# Patient Record
Sex: MALE | Race: OTHER | NOT HISPANIC OR LATINO | URBAN - METROPOLITAN AREA
[De-identification: names, ages, dates, MRNs, and addresses within clinical notes are randomized per-mention and may not be internally consistent; named-entity substitution may affect disease eponyms.]

---

## 2020-06-07 ENCOUNTER — EMERGENCY (EMERGENCY)
Facility: HOSPITAL | Age: 30
LOS: 1 days | End: 2020-06-07
Attending: EMERGENCY MEDICINE | Admitting: EMERGENCY MEDICINE
Payer: COMMERCIAL

## 2020-06-07 VITALS
HEART RATE: 85 BPM | DIASTOLIC BLOOD PRESSURE: 68 MMHG | OXYGEN SATURATION: 98 % | TEMPERATURE: 98 F | RESPIRATION RATE: 20 BRPM | SYSTOLIC BLOOD PRESSURE: 118 MMHG

## 2020-06-07 VITALS
HEART RATE: 102 BPM | RESPIRATION RATE: 16 BRPM | WEIGHT: 179.9 LBS | HEIGHT: 66 IN | SYSTOLIC BLOOD PRESSURE: 110 MMHG | DIASTOLIC BLOOD PRESSURE: 60 MMHG | OXYGEN SATURATION: 97 % | TEMPERATURE: 98 F

## 2020-06-07 PROCEDURE — 71101 X-RAY EXAM UNILAT RIBS/CHEST: CPT

## 2020-06-07 PROCEDURE — 71101 X-RAY EXAM UNILAT RIBS/CHEST: CPT | Mod: 26

## 2020-06-07 PROCEDURE — 99283 EMERGENCY DEPT VISIT LOW MDM: CPT | Mod: 25

## 2020-06-07 PROCEDURE — 99283 EMERGENCY DEPT VISIT LOW MDM: CPT

## 2020-06-07 RX ORDER — IBUPROFEN 200 MG
1 TABLET ORAL
Qty: 12 | Refills: 0
Start: 2020-06-07 | End: 2020-06-09

## 2020-06-07 RX ORDER — LIDOCAINE 4 G/100G
1 CREAM TOPICAL
Qty: 30 | Refills: 0
Start: 2020-06-07 | End: 2020-07-06

## 2020-06-07 RX ORDER — OXYCODONE AND ACETAMINOPHEN 5; 325 MG/1; MG/1
1 TABLET ORAL ONCE
Refills: 0 | Status: DISCONTINUED | OUTPATIENT
Start: 2020-06-07 | End: 2020-06-07

## 2020-06-07 RX ORDER — LIDOCAINE 4 G/100G
1 CREAM TOPICAL ONCE
Refills: 0 | Status: COMPLETED | OUTPATIENT
Start: 2020-06-07 | End: 2020-06-07

## 2020-06-07 RX ADMIN — OXYCODONE AND ACETAMINOPHEN 1 TABLET(S): 5; 325 TABLET ORAL at 19:32

## 2020-06-07 RX ADMIN — LIDOCAINE 1 PATCH: 4 CREAM TOPICAL at 19:33

## 2020-06-07 NOTE — ED PROVIDER NOTE - CLINICAL SUMMARY MEDICAL DECISION MAKING FREE TEXT BOX
pt with chest wall tenderness s/p injury. will do x rays to r/o rib fx, incentive spirometer, pain control, lidoderm.

## 2020-06-07 NOTE — ED PROVIDER NOTE - ATTENDING CONTRIBUTION TO CARE
Remy Patel MD: I have personally performed a face to face diagnostic evaluation on this patient.  I have reviewed the PA note and agree with the history, exam, and plan of care, except as noted.  History and Exam by me shows same findings as documented  Attending note: Patient with injury to right anterolateral ribs. No other injury. Tender to ribs with no palpable crepitus. Breath sounds normal. No abdo pain or tenderness. Agree with plan

## 2020-06-07 NOTE — ED ADULT NURSE NOTE - NSIMPLEMENTINTERV_GEN_ALL_ED
Implemented All Universal Safety Interventions:  [place] to call system. Call bell, personal items and telephone within reach. Instruct patient to call for assistance. Room bathroom lighting operational. Non-slip footwear when patient is off stretcher. Physically safe environment: no spills, clutter or unnecessary equipment. Stretcher in lowest position, wheels locked, appropriate side rails in place.

## 2020-06-07 NOTE — ED PROVIDER NOTE - NSFOLLOWUPCLINICS_GEN_ALL_ED_FT
[facility]  Family Medicine  [address] [place] [address]  Phone: [phone]  Fax:   Follow Up Time:     [facility] Medicine  [address] [place] [address]  Phone: [phone]  Fax:   Follow Up Time:

## 2020-06-07 NOTE — ED ADULT TRIAGE NOTE - CHIEF COMPLAINT QUOTE
" My friend jump off the swimming pool board, and hit me as I lay in the float " " My friend jump off the swimming pool diving board, and hit me as I lay in a float "

## 2020-06-07 NOTE — ED PROVIDER NOTE - PHYSICAL EXAMINATION
SPINE: c-spine: supple, no spinal tenderness. no midline tenderness. no paraspinal tenderness. no body step off. no deformity. no muscle spasm. FROM   Thoracic spine: no spinal tenderness. no midline tenderness. no paraspinal tenderness. no body step off. no deformity. no muscle spasm.FROM   LS-spine:no spinal tenderness. no midline tenderness. no paraspinal tenderness. no body step off. no deformity.no muscle spasm. FROM n

## 2020-06-07 NOTE — ED PROVIDER NOTE - PATIENT PORTAL LINK FT
You can access the FollowMyHealth Patient Portal offered by [facility] by registering at the following website: [email or web address]. By joining [facility] [employer]’s FollowMyHealth portal, you will also be able to view your health information using other applications (apps) compatible with our system.

## 2020-06-07 NOTE — ED PROVIDER NOTE - PROGRESS NOTE DETAILS
pt improved. x rays reviewed on acute read. no acute findings. pt advised on possible findings on final read. will rx ibuprofen and lidoderm. incentive spirometer given. advised pmd follow up. all questions answered will dc

## 2020-06-07 NOTE — ED ADULT NURSE NOTE - OBJECTIVE STATEMENT
pt comes to ed c/o right side rib pain, pt states he was floating in the pool when his friend jumped off the diving board landing on him. states the pain is worse when he breathes or moves. pt denies cp, sob, cough, fever. breathe sounds equal bilaterals, unlabored.

## 2020-06-07 NOTE — ED PROVIDER NOTE - NSFOLLOWUPINSTRUCTIONS_ED_ALL_ED_FT
Rib Contusion    WHAT YOU NEED TO KNOW:    A rib contusion is a bruise on one or more of your ribs. Rib Cage         DISCHARGE INSTRUCTIONS:    Return to the emergency department if:     You have increased chest pain.       You have shortness of breath.       You start to cough up blood.      Your pain does not improve with pain medicine.    Contact your healthcare provider if:     You have a cough.      You have a fever.       You have questions or concerns about your condition or care.     Medicines: You may need any of the following:     NSAIDs, such as ibuprofen, help decrease swelling, pain, and fever. This medicine is available with or without a doctor's order. NSAIDs can cause stomach bleeding or kidney problems in certain people. If you take blood thinner medicine, always ask if NSAIDs are safe for you. Always read the medicine label and follow directions. Do not give these medicines to children under 6 months of age without direction from your child's healthcare provider.      Prescription pain medicine may be given. Ask how to take this medicine safely.      Take your medicine as directed. Contact your healthcare provider if you think your medicine is not helping or if you have side effects. Tell him of her if you are allergic to any medicine. Keep a list of the medicines, vitamins, and herbs you take. Include the amounts, and when and why you take them. Bring the list or the pill bottles to follow-up visits. Carry your medicine list with you in case of an emergency.    Deep breathing:     To help prevent pneumonia, take 10 deep breaths every hour, even when you wake up during the night. Brace your ribs with your hands or a pillow while you take deep breaths or cough. This will help decrease your pain.      You may need to use an incentive spirometer to help you take deeper breaths. Put the plastic piece into your mouth and take a very deep breath. Hold your breath as long as you can. Then let out your breath. Do this 10 times in a row every hour while you are awake.    Rest: Rest your ribs to decrease swelling and allow the injury to heal faster. Avoid activities that may cause more pain or damage to your ribs. As your pain decreases, begin movements slowly.    Ice: Ice helps decrease swelling and pain. Ice may also help prevent tissue damage. Use an ice pack or put crushed ice in a plastic bag. Cover it with a towel and place it on your bruised area for 15 to 20 minutes every hour as directed.    Follow up with your healthcare provider as directed: Write down your questions so you remember to ask them during your visits.       use incentive spirometer as discussed 10 breaths an hour for 3 days. take ibuprofen 600mg every 6 hours for pain. apply ice. use lidoderm patches

## 2020-06-07 NOTE — ED PROVIDER NOTE - OBJECTIVE STATEMENT
30 y/o male, current smoker, no significant PMHx, presents with rib pain today. Pt reports he was laying on a pool float and his friend jumped off a diving board onto pt's R side causing R rib pain. Pain increased with breathing and movement. Did not take anything for pain. Denies sob, cough, or fever.

## 2021-01-01 ENCOUNTER — OUTPATIENT (OUTPATIENT)
Dept: OUTPATIENT SERVICES | Facility: HOSPITAL | Age: 31
LOS: 1 days | End: 2021-01-01
Payer: COMMERCIAL

## 2021-01-01 DIAGNOSIS — Z20.828 CONTACT WITH AND (SUSPECTED) EXPOSURE TO OTHER VIRAL COMMUNICABLE DISEASES: ICD-10-CM

## 2021-01-01 LAB — SARS-COV-2 RNA SPEC QL NAA+PROBE: SIGNIFICANT CHANGE UP

## 2021-01-01 PROCEDURE — U0005: CPT

## 2021-01-01 PROCEDURE — C9803: CPT

## 2021-01-01 PROCEDURE — U0003: CPT

## 2021-01-02 DIAGNOSIS — Z20.828 CONTACT WITH AND (SUSPECTED) EXPOSURE TO OTHER VIRAL COMMUNICABLE DISEASES: ICD-10-CM

## 2021-08-10 NOTE — ED ADULT TRIAGE NOTE - SPO2 (%)
97 Graft Donor Site Bandage (Optional-Leave Blank If You Don't Want In Note): Steri-strips and a pressure bandage were applied to the donor site.

## 2021-09-23 ENCOUNTER — APPOINTMENT (OUTPATIENT)
Dept: INTERNAL MEDICINE | Facility: CLINIC | Age: 31
End: 2021-09-23

## 2021-09-30 ENCOUNTER — NON-APPOINTMENT (OUTPATIENT)
Age: 31
End: 2021-09-30

## 2021-09-30 ENCOUNTER — APPOINTMENT (OUTPATIENT)
Dept: INTERNAL MEDICINE | Facility: CLINIC | Age: 31
End: 2021-09-30
Payer: COMMERCIAL

## 2021-09-30 ENCOUNTER — LABORATORY RESULT (OUTPATIENT)
Age: 31
End: 2021-09-30

## 2021-09-30 VITALS
SYSTOLIC BLOOD PRESSURE: 106 MMHG | HEIGHT: 66 IN | HEART RATE: 65 BPM | DIASTOLIC BLOOD PRESSURE: 62 MMHG | RESPIRATION RATE: 14 BRPM | WEIGHT: 192 LBS | BODY MASS INDEX: 30.86 KG/M2 | TEMPERATURE: 97.2 F | OXYGEN SATURATION: 98 %

## 2021-09-30 DIAGNOSIS — M54.42 LUMBAGO WITH SCIATICA, LEFT SIDE: ICD-10-CM

## 2021-09-30 DIAGNOSIS — M51.9 UNSPECIFIED THORACIC, THORACOLUMBAR AND LUMBOSACRAL INTERVERTEBRAL DISC DISORDER: ICD-10-CM

## 2021-09-30 PROCEDURE — 90471 IMMUNIZATION ADMIN: CPT

## 2021-09-30 PROCEDURE — 90686 IIV4 VACC NO PRSV 0.5 ML IM: CPT

## 2021-09-30 PROCEDURE — 99385 PREV VISIT NEW AGE 18-39: CPT | Mod: 25

## 2021-09-30 PROCEDURE — 93000 ELECTROCARDIOGRAM COMPLETE: CPT | Mod: 59

## 2021-09-30 NOTE — HISTORY OF PRESENT ILLNESS
[FreeTextEntry1] : Here for CPE.\par Overall feeling well\par Pt has received two doses of Moderna vaccine [de-identified] : Doesn't smoke. Occaional alcohol\par Exercises 2-3 times per week\par Pt reports having a history of lumbar disc disease. He feels that it is getting worse over the past year.\par He had an MRI 8 years ago. The pain radiates to his left buttock and left leg.\par Pt reports having dyspepsia after eating certain foods. He has a history of hemorrhoids.

## 2021-09-30 NOTE — HEALTH RISK ASSESSMENT
[Excellent] : ~his/her~ current health as excellent [Very Good] : ~his/her~  mood as very good [Yes] : Yes [] : No [de-identified] : occasional [de-identified] : Exercises 2-3 times per week

## 2021-09-30 NOTE — PLAN
[FreeTextEntry1] : See Dr. Laurent, spine specialist, regarding sciatica\par See Dr. ALVAREZ , Colorectal, regarding hemorrhoids.\par See GI regarding dyspepsia

## 2021-10-03 LAB
ALBUMIN SERPL ELPH-MCNC: 4.7 G/DL
ALP BLD-CCNC: 69 U/L
ALT SERPL-CCNC: 18 U/L
ANION GAP SERPL CALC-SCNC: 11 MMOL/L
APPEARANCE: ABNORMAL
AST SERPL-CCNC: 18 U/L
BASOPHILS # BLD AUTO: 0.04 K/UL
BASOPHILS NFR BLD AUTO: 0.6 %
BILIRUB SERPL-MCNC: 0.4 MG/DL
BILIRUBIN URINE: NEGATIVE
BLOOD URINE: NEGATIVE
BUN SERPL-MCNC: 20 MG/DL
CALCIUM SERPL-MCNC: 9.7 MG/DL
CHLORIDE SERPL-SCNC: 105 MMOL/L
CHOLEST SERPL-MCNC: 229 MG/DL
CO2 SERPL-SCNC: 25 MMOL/L
COLOR: YELLOW
COVID-19 NUCLEOCAPSID  GAM ANTIBODY INTERPRETATION: NEGATIVE
COVID-19 SPIKE DOMAIN ANTIBODY INTERPRETATION: POSITIVE
CREAT SERPL-MCNC: 0.87 MG/DL
EOSINOPHIL # BLD AUTO: 0.12 K/UL
EOSINOPHIL NFR BLD AUTO: 1.7 %
ESTIMATED AVERAGE GLUCOSE: 103 MG/DL
FOLATE SERPL-MCNC: 5.5 NG/ML
GLUCOSE QUALITATIVE U: NEGATIVE
GLUCOSE SERPL-MCNC: 104 MG/DL
HBA1C MFR BLD HPLC: 5.2 %
HCT VFR BLD CALC: 47.3 %
HDLC SERPL-MCNC: 47 MG/DL
HGB BLD-MCNC: 14.9 G/DL
IMM GRANULOCYTES NFR BLD AUTO: 0.4 %
KETONES URINE: NEGATIVE
LDLC SERPL CALC-MCNC: 168 MG/DL
LEUKOCYTE ESTERASE URINE: NEGATIVE
LYMPHOCYTES # BLD AUTO: 2.38 K/UL
LYMPHOCYTES NFR BLD AUTO: 34.1 %
MAN DIFF?: NORMAL
MCHC RBC-ENTMCNC: 25.2 PG
MCHC RBC-ENTMCNC: 31.5 GM/DL
MCV RBC AUTO: 79.9 FL
MONOCYTES # BLD AUTO: 0.63 K/UL
MONOCYTES NFR BLD AUTO: 9 %
NEUTROPHILS # BLD AUTO: 3.78 K/UL
NEUTROPHILS NFR BLD AUTO: 54.2 %
NITRITE URINE: NEGATIVE
NONHDLC SERPL-MCNC: 182 MG/DL
PH URINE: 7
PLATELET # BLD AUTO: 200 K/UL
POTASSIUM SERPL-SCNC: 4.8 MMOL/L
PROT SERPL-MCNC: 6.8 G/DL
PROTEIN URINE: NEGATIVE
PSA SERPL-MCNC: 0.77 NG/ML
RBC # BLD: 5.92 M/UL
RBC # FLD: 15.3 %
SARS-COV-2 AB SERPL IA-ACNC: >250 U/ML
SARS-COV-2 AB SERPL QL IA: 0.08 INDEX
SODIUM SERPL-SCNC: 141 MMOL/L
SPECIFIC GRAVITY URINE: 1.02
TRIGL SERPL-MCNC: 70 MG/DL
TSH SERPL-ACNC: 2.1 UIU/ML
UROBILINOGEN URINE: NORMAL
VIT B12 SERPL-MCNC: 538 PG/ML
WBC # FLD AUTO: 6.98 K/UL

## 2021-10-08 LAB
ANTI ENDOMYSIAL IGA IFA: NEGATIVE
ANTI HUMAN TISSUE TRANSGLUTAMINASE IGA ELISA: < 1.9
DEAMIDATED GLIADIN ANTIBODY IGG: < 2.8
DEAMIDATED GLIADIN PEPTIDE IGA: < 5.2
PROMETHEUS CELIAC GENETICS: NORMAL
PROMETHEUS CELIAC SEROLOGY: NORMAL
PROMETHEUS LABORATORY FOOTER: NORMAL
TOTAL SERUM IGA BY NEPHELOMETRY: 129 MG/DL
TTG IGG SER IA-ACNC: NORMAL

## 2021-10-28 ENCOUNTER — NON-APPOINTMENT (OUTPATIENT)
Age: 31
End: 2021-10-28

## 2021-10-28 ENCOUNTER — APPOINTMENT (OUTPATIENT)
Dept: CARDIOLOGY | Facility: CLINIC | Age: 31
End: 2021-10-28
Payer: COMMERCIAL

## 2021-10-28 VITALS
OXYGEN SATURATION: 99 % | SYSTOLIC BLOOD PRESSURE: 110 MMHG | DIASTOLIC BLOOD PRESSURE: 70 MMHG | HEIGHT: 66 IN | BODY MASS INDEX: 30.53 KG/M2 | HEART RATE: 65 BPM | WEIGHT: 190 LBS

## 2021-10-28 DIAGNOSIS — I95.9 HYPOTENSION, UNSPECIFIED: ICD-10-CM

## 2021-10-28 PROCEDURE — 99204 OFFICE O/P NEW MOD 45 MIN: CPT

## 2021-10-28 PROCEDURE — 93000 ELECTROCARDIOGRAM COMPLETE: CPT

## 2021-11-16 ENCOUNTER — APPOINTMENT (OUTPATIENT)
Dept: CARDIOLOGY | Facility: CLINIC | Age: 31
End: 2021-11-16
Payer: COMMERCIAL

## 2021-11-16 PROCEDURE — 93351 STRESS TTE COMPLETE: CPT

## 2021-11-16 PROCEDURE — 93306 TTE W/DOPPLER COMPLETE: CPT

## 2022-01-21 ENCOUNTER — TRANSCRIPTION ENCOUNTER (OUTPATIENT)
Age: 32
End: 2022-01-21

## 2022-10-04 ENCOUNTER — APPOINTMENT (OUTPATIENT)
Dept: INTERNAL MEDICINE | Facility: CLINIC | Age: 32
End: 2022-10-04

## 2022-10-04 ENCOUNTER — NON-APPOINTMENT (OUTPATIENT)
Age: 32
End: 2022-10-04

## 2022-10-04 VITALS
HEART RATE: 61 BPM | HEIGHT: 66 IN | BODY MASS INDEX: 27.48 KG/M2 | TEMPERATURE: 97 F | DIASTOLIC BLOOD PRESSURE: 50 MMHG | SYSTOLIC BLOOD PRESSURE: 110 MMHG | OXYGEN SATURATION: 99 % | WEIGHT: 171 LBS | RESPIRATION RATE: 14 BRPM

## 2022-10-04 DIAGNOSIS — R10.13 EPIGASTRIC PAIN: ICD-10-CM

## 2022-10-04 PROCEDURE — 99395 PREV VISIT EST AGE 18-39: CPT | Mod: 25

## 2022-10-04 PROCEDURE — G0008: CPT

## 2022-10-04 PROCEDURE — 90686 IIV4 VACC NO PRSV 0.5 ML IM: CPT

## 2022-10-04 PROCEDURE — 93000 ELECTROCARDIOGRAM COMPLETE: CPT

## 2022-10-04 NOTE — HISTORY OF PRESENT ILLNESS
[FreeTextEntry1] : Pt is here for CPE\par Still c/o dyspepsia after eating certain foods, but he never saw GI as requested at the last visit.\par Still c/i hemorrhoids, but never saw Colorectal as requested at the last visit.\par Vaxed and 1 booster against covid.\par  [de-identified] : Never a smoker. Occasional alcohol.\par Doesn't exercise much

## 2022-10-04 NOTE — HEALTH RISK ASSESSMENT
[Very Good] : ~his/her~  mood as very good [Yes] : Yes [de-identified] : Occasional alcohol [de-identified] : Doesn't exercise much

## 2022-10-04 NOTE — PLAN
[FreeTextEntry1] : See GI regarding dyspepsia\par See Colorectal regarding hemorrhoids\par Check labs\par Recommend frequent exercise and healthy diet

## 2022-10-08 LAB
ALBUMIN SERPL ELPH-MCNC: 4.7 G/DL
ALP BLD-CCNC: 77 U/L
ALT SERPL-CCNC: 11 U/L
ANION GAP SERPL CALC-SCNC: 11 MMOL/L
APPEARANCE: CLEAR
AST SERPL-CCNC: 16 U/L
BASOPHILS # BLD AUTO: 0.05 K/UL
BASOPHILS NFR BLD AUTO: 0.7 %
BILIRUB SERPL-MCNC: 0.2 MG/DL
BILIRUBIN URINE: NEGATIVE
BLOOD URINE: NEGATIVE
BUN SERPL-MCNC: 13 MG/DL
CALCIUM SERPL-MCNC: 9.9 MG/DL
CHLORIDE SERPL-SCNC: 103 MMOL/L
CHOLEST SERPL-MCNC: 179 MG/DL
CO2 SERPL-SCNC: 27 MMOL/L
COLOR: NORMAL
CREAT SERPL-MCNC: 0.9 MG/DL
EGFR: 115 ML/MIN/1.73M2
EOSINOPHIL # BLD AUTO: 0.12 K/UL
EOSINOPHIL NFR BLD AUTO: 1.7 %
ESTIMATED AVERAGE GLUCOSE: 103 MG/DL
FERRITIN SERPL-MCNC: 297 NG/ML
FOLATE SERPL-MCNC: 4 NG/ML
GLUCOSE QUALITATIVE U: NEGATIVE
GLUCOSE SERPL-MCNC: 80 MG/DL
HBA1C MFR BLD HPLC: 5.2 %
HCT VFR BLD CALC: 46.5 %
HDLC SERPL-MCNC: 44 MG/DL
HGB BLD-MCNC: 14.8 G/DL
IMM GRANULOCYTES NFR BLD AUTO: 0.3 %
IRON SATN MFR SERPL: 36 %
IRON SERPL-MCNC: 106 UG/DL
KETONES URINE: NEGATIVE
LDLC SERPL CALC-MCNC: 123 MG/DL
LEUKOCYTE ESTERASE URINE: NEGATIVE
LYMPHOCYTES # BLD AUTO: 2.56 K/UL
LYMPHOCYTES NFR BLD AUTO: 35.3 %
MAN DIFF?: NORMAL
MCHC RBC-ENTMCNC: 25.6 PG
MCHC RBC-ENTMCNC: 31.8 GM/DL
MCV RBC AUTO: 80.3 FL
MONOCYTES # BLD AUTO: 0.66 K/UL
MONOCYTES NFR BLD AUTO: 9.1 %
NEUTROPHILS # BLD AUTO: 3.85 K/UL
NEUTROPHILS NFR BLD AUTO: 52.9 %
NITRITE URINE: NEGATIVE
NONHDLC SERPL-MCNC: 135 MG/DL
PH URINE: 6.5
PLATELET # BLD AUTO: 236 K/UL
POTASSIUM SERPL-SCNC: 5 MMOL/L
PROT SERPL-MCNC: 7.1 G/DL
PROTEIN URINE: NEGATIVE
PSA SERPL-MCNC: 0.73 NG/ML
RBC # BLD: 5.79 M/UL
RBC # FLD: 15.1 %
SODIUM SERPL-SCNC: 141 MMOL/L
SPECIFIC GRAVITY URINE: 1.02
TIBC SERPL-MCNC: 291 UG/DL
TRIGL SERPL-MCNC: 63 MG/DL
TSH SERPL-ACNC: 2.1 UIU/ML
UIBC SERPL-MCNC: 186 UG/DL
UROBILINOGEN URINE: NORMAL
VIT B12 SERPL-MCNC: 484 PG/ML
WBC # FLD AUTO: 7.26 K/UL

## 2022-11-17 ENCOUNTER — APPOINTMENT (OUTPATIENT)
Dept: COLORECTAL SURGERY | Facility: CLINIC | Age: 32
End: 2022-11-17

## 2022-11-17 ENCOUNTER — NON-APPOINTMENT (OUTPATIENT)
Age: 32
End: 2022-11-17

## 2022-11-17 PROCEDURE — 46600 DIAGNOSTIC ANOSCOPY SPX: CPT

## 2022-11-17 PROCEDURE — 99203 OFFICE O/P NEW LOW 30 MIN: CPT | Mod: 25

## 2022-11-17 NOTE — PHYSICAL EXAM
[Excoriation] : no perianal excoriation [Normal] : was normal [None] : there was no rectal mass  [Gross Blood] : no gross blood [Respiratory Effort] : normal respiratory effort [Normal Rate and Rhythm] : normal rate and rhythm [Calm] : calm [de-identified] : Soft, nontender, nondistended.  No mass or hernias appreciated. [de-identified] : Inflamed grade 3 internal hemorrhoids [de-identified] : Circumferential nonthrombosed external hemorrhoids [de-identified] : Well-appearing, in no distress [de-identified] : Normocephalic, atraumatic [de-identified] : Moves extremities without difficulty [de-identified] : Warm and dry [de-identified] : Alert and oriented x3

## 2022-11-17 NOTE — HISTORY OF PRESENT ILLNESS
[FreeTextEntry1] : 32-year-old male who presents for consultation for hemorrhoids.  His symptoms began about 6 years ago when he developed intermittent rectal bleeding.  He had a colonoscopy at that time which was unremarkable with the exception of hemorrhoids noted.  He changed his diet and lost a significant amount of weight, approximately 50 pounds.  Over the past year, his hemorrhoidal symptoms have worsened with frequent bouts of rectal bleeding associated with pain and a burning sensation in the area.  Symptoms are worse with standing and walking.  He has regular formed bowel movements but tends to have upwards of 5-6 stools daily.  He denies abdominal pain nausea or vomiting but does get abdominal bloating related to certain foods.

## 2022-11-17 NOTE — CONSULT LETTER
[Dear  ___] : Dear  [unfilled], [Consult Letter:] : I had the pleasure of evaluating your patient, [unfilled]. [Please see my note below.] : Please see my note below. [Consult Closing:] : Thank you very much for allowing me to participate in the care of this patient.  If you have any questions, please do not hesitate to contact me. [Sincerely,] : Sincerely, [FreeTextEntry3] : Ruslan Cotto MD\par

## 2022-12-07 ENCOUNTER — APPOINTMENT (OUTPATIENT)
Dept: GASTROENTEROLOGY | Facility: CLINIC | Age: 32
End: 2022-12-07

## 2022-12-22 ENCOUNTER — OUTPATIENT (OUTPATIENT)
Dept: OUTPATIENT SERVICES | Facility: HOSPITAL | Age: 32
LOS: 1 days | End: 2022-12-22
Payer: COMMERCIAL

## 2022-12-22 VITALS
SYSTOLIC BLOOD PRESSURE: 134 MMHG | OXYGEN SATURATION: 100 % | TEMPERATURE: 98 F | WEIGHT: 165.35 LBS | HEART RATE: 57 BPM | RESPIRATION RATE: 16 BRPM | HEIGHT: 66 IN | DIASTOLIC BLOOD PRESSURE: 67 MMHG

## 2022-12-22 DIAGNOSIS — Z01.818 ENCOUNTER FOR OTHER PREPROCEDURAL EXAMINATION: ICD-10-CM

## 2022-12-22 DIAGNOSIS — K64.9 UNSPECIFIED HEMORRHOIDS: ICD-10-CM

## 2022-12-22 DIAGNOSIS — Z90.89 ACQUIRED ABSENCE OF OTHER ORGANS: Chronic | ICD-10-CM

## 2022-12-22 LAB
ANION GAP SERPL CALC-SCNC: 5 MMOL/L — SIGNIFICANT CHANGE UP (ref 5–17)
APPEARANCE UR: CLEAR — SIGNIFICANT CHANGE UP
APTT BLD: 33.1 SEC — SIGNIFICANT CHANGE UP (ref 27.5–35.5)
BACTERIA # UR AUTO: ABNORMAL
BASOPHILS # BLD AUTO: 0.04 K/UL — SIGNIFICANT CHANGE UP (ref 0–0.2)
BASOPHILS NFR BLD AUTO: 0.6 % — SIGNIFICANT CHANGE UP (ref 0–2)
BILIRUB UR-MCNC: NEGATIVE — SIGNIFICANT CHANGE UP
BUN SERPL-MCNC: 18 MG/DL — SIGNIFICANT CHANGE UP (ref 7–23)
CALCIUM SERPL-MCNC: 9.4 MG/DL — SIGNIFICANT CHANGE UP (ref 8.5–10.1)
CHLORIDE SERPL-SCNC: 111 MMOL/L — HIGH (ref 96–108)
CO2 SERPL-SCNC: 25 MMOL/L — SIGNIFICANT CHANGE UP (ref 22–31)
COLOR SPEC: YELLOW — SIGNIFICANT CHANGE UP
CREAT SERPL-MCNC: 0.89 MG/DL — SIGNIFICANT CHANGE UP (ref 0.5–1.3)
DIFF PNL FLD: NEGATIVE — SIGNIFICANT CHANGE UP
EGFR: 115 ML/MIN/1.73M2 — SIGNIFICANT CHANGE UP
EOSINOPHIL # BLD AUTO: 0.04 K/UL — SIGNIFICANT CHANGE UP (ref 0–0.5)
EOSINOPHIL NFR BLD AUTO: 0.6 % — SIGNIFICANT CHANGE UP (ref 0–6)
EPI CELLS # UR: SIGNIFICANT CHANGE UP
GLUCOSE SERPL-MCNC: 89 MG/DL — SIGNIFICANT CHANGE UP (ref 70–99)
GLUCOSE UR QL: NEGATIVE — SIGNIFICANT CHANGE UP
HCT VFR BLD CALC: 41.6 % — SIGNIFICANT CHANGE UP (ref 39–50)
HGB BLD-MCNC: 13.3 G/DL — SIGNIFICANT CHANGE UP (ref 13–17)
IMM GRANULOCYTES NFR BLD AUTO: 0.1 % — SIGNIFICANT CHANGE UP (ref 0–0.9)
INR BLD: 1.1 RATIO — SIGNIFICANT CHANGE UP (ref 0.88–1.16)
KETONES UR-MCNC: ABNORMAL
LEUKOCYTE ESTERASE UR-ACNC: ABNORMAL
LYMPHOCYTES # BLD AUTO: 2.57 K/UL — SIGNIFICANT CHANGE UP (ref 1–3.3)
LYMPHOCYTES # BLD AUTO: 35.8 % — SIGNIFICANT CHANGE UP (ref 13–44)
MCHC RBC-ENTMCNC: 25.7 PG — LOW (ref 27–34)
MCHC RBC-ENTMCNC: 32 GM/DL — SIGNIFICANT CHANGE UP (ref 32–36)
MCV RBC AUTO: 80.3 FL — SIGNIFICANT CHANGE UP (ref 80–100)
MONOCYTES # BLD AUTO: 0.53 K/UL — SIGNIFICANT CHANGE UP (ref 0–0.9)
MONOCYTES NFR BLD AUTO: 7.4 % — SIGNIFICANT CHANGE UP (ref 2–14)
NEUTROPHILS # BLD AUTO: 3.98 K/UL — SIGNIFICANT CHANGE UP (ref 1.8–7.4)
NEUTROPHILS NFR BLD AUTO: 55.5 % — SIGNIFICANT CHANGE UP (ref 43–77)
NITRITE UR-MCNC: NEGATIVE — SIGNIFICANT CHANGE UP
PH UR: 6 — SIGNIFICANT CHANGE UP (ref 5–8)
PLATELET # BLD AUTO: 201 K/UL — SIGNIFICANT CHANGE UP (ref 150–400)
POTASSIUM SERPL-MCNC: 4.2 MMOL/L — SIGNIFICANT CHANGE UP (ref 3.5–5.3)
POTASSIUM SERPL-SCNC: 4.2 MMOL/L — SIGNIFICANT CHANGE UP (ref 3.5–5.3)
PROT UR-MCNC: NEGATIVE — SIGNIFICANT CHANGE UP
PROTHROM AB SERPL-ACNC: 12.8 SEC — SIGNIFICANT CHANGE UP (ref 10.5–13.4)
RBC # BLD: 5.18 M/UL — SIGNIFICANT CHANGE UP (ref 4.2–5.8)
RBC # FLD: 14.9 % — HIGH (ref 10.3–14.5)
RBC CASTS # UR COMP ASSIST: NEGATIVE /HPF — SIGNIFICANT CHANGE UP (ref 0–4)
SODIUM SERPL-SCNC: 141 MMOL/L — SIGNIFICANT CHANGE UP (ref 135–145)
SP GR SPEC: 1.01 — SIGNIFICANT CHANGE UP (ref 1.01–1.02)
UROBILINOGEN FLD QL: NEGATIVE — SIGNIFICANT CHANGE UP
WBC # BLD: 7.17 K/UL — SIGNIFICANT CHANGE UP (ref 3.8–10.5)
WBC # FLD AUTO: 7.17 K/UL — SIGNIFICANT CHANGE UP (ref 3.8–10.5)
WBC UR QL: SIGNIFICANT CHANGE UP /HPF (ref 0–5)

## 2022-12-22 PROCEDURE — 81001 URINALYSIS AUTO W/SCOPE: CPT

## 2022-12-22 PROCEDURE — 85730 THROMBOPLASTIN TIME PARTIAL: CPT

## 2022-12-22 PROCEDURE — 99214 OFFICE O/P EST MOD 30 MIN: CPT | Mod: 25

## 2022-12-22 PROCEDURE — 85025 COMPLETE CBC W/AUTO DIFF WBC: CPT

## 2022-12-22 PROCEDURE — 80048 BASIC METABOLIC PNL TOTAL CA: CPT

## 2022-12-22 PROCEDURE — 85610 PROTHROMBIN TIME: CPT

## 2022-12-22 PROCEDURE — 36415 COLL VENOUS BLD VENIPUNCTURE: CPT

## 2022-12-22 NOTE — H&P PST ADULT - ASSESSMENT
This is a 31 y/o male with hemorrhoids who is schedule for a hemorrhoidectomy    Patient instructed on     1. To follow instructions as per ASU for NPO status   2. Instructed to schedule COVID 19 swab appointment

## 2022-12-22 NOTE — H&P PST ADULT - HISTORY OF PRESENT ILLNESS
This is a 31 y/o male with no PMH who is scheduled for a hemorrhoidectomy. Pt reports he had hemorrhoids a few years ago, he had a colonoscopy at that time and hemorrhoids were found at that time. He reports he lost weight and his symptoms resolved.  About six months ago (August 2022) his symptoms started up again where he reports + rectal bleeding, pain and burning sensation. He denies any SOB, chest pain, palpations or recent fevers.

## 2022-12-23 DIAGNOSIS — K64.9 UNSPECIFIED HEMORRHOIDS: ICD-10-CM

## 2022-12-23 DIAGNOSIS — Z01.818 ENCOUNTER FOR OTHER PREPROCEDURAL EXAMINATION: ICD-10-CM

## 2022-12-27 ENCOUNTER — TRANSCRIPTION ENCOUNTER (OUTPATIENT)
Age: 32
End: 2022-12-27

## 2022-12-27 ENCOUNTER — APPOINTMENT (OUTPATIENT)
Dept: COLORECTAL SURGERY | Facility: HOSPITAL | Age: 32
End: 2022-12-27
Payer: COMMERCIAL

## 2022-12-27 ENCOUNTER — OUTPATIENT (OUTPATIENT)
Dept: INPATIENT UNIT | Facility: HOSPITAL | Age: 32
LOS: 1 days | Discharge: HOME | End: 2022-12-27
Payer: COMMERCIAL

## 2022-12-27 ENCOUNTER — RESULT REVIEW (OUTPATIENT)
Age: 32
End: 2022-12-27

## 2022-12-27 VITALS
OXYGEN SATURATION: 100 % | TEMPERATURE: 98 F | SYSTOLIC BLOOD PRESSURE: 114 MMHG | DIASTOLIC BLOOD PRESSURE: 63 MMHG | HEIGHT: 66 IN | RESPIRATION RATE: 16 BRPM | HEART RATE: 57 BPM | WEIGHT: 164.91 LBS

## 2022-12-27 VITALS
RESPIRATION RATE: 16 BRPM | DIASTOLIC BLOOD PRESSURE: 65 MMHG | OXYGEN SATURATION: 100 % | SYSTOLIC BLOOD PRESSURE: 110 MMHG | HEART RATE: 55 BPM | TEMPERATURE: 98 F

## 2022-12-27 DIAGNOSIS — K64.9 UNSPECIFIED HEMORRHOIDS: ICD-10-CM

## 2022-12-27 DIAGNOSIS — Z90.89 ACQUIRED ABSENCE OF OTHER ORGANS: Chronic | ICD-10-CM

## 2022-12-27 PROCEDURE — 46260 REMOVE IN/EX HEM GROUPS 2+: CPT

## 2022-12-27 PROCEDURE — C1889: CPT

## 2022-12-27 PROCEDURE — 88304 TISSUE EXAM BY PATHOLOGIST: CPT | Mod: 26

## 2022-12-27 PROCEDURE — 88304 TISSUE EXAM BY PATHOLOGIST: CPT

## 2022-12-27 PROCEDURE — C9290: CPT

## 2022-12-27 RX ORDER — FENTANYL CITRATE 50 UG/ML
50 INJECTION INTRAVENOUS
Refills: 0 | Status: DISCONTINUED | OUTPATIENT
Start: 2022-12-27 | End: 2022-12-27

## 2022-12-27 RX ORDER — OXYCODONE HYDROCHLORIDE 5 MG/1
1 TABLET ORAL
Qty: 20 | Refills: 0
Start: 2022-12-27

## 2022-12-27 RX ORDER — HYDROMORPHONE HYDROCHLORIDE 2 MG/ML
0.5 INJECTION INTRAMUSCULAR; INTRAVENOUS; SUBCUTANEOUS
Refills: 0 | Status: DISCONTINUED | OUTPATIENT
Start: 2022-12-27 | End: 2022-12-27

## 2022-12-27 RX ORDER — SODIUM CHLORIDE 9 MG/ML
1000 INJECTION, SOLUTION INTRAVENOUS
Refills: 0 | Status: DISCONTINUED | OUTPATIENT
Start: 2022-12-27 | End: 2022-12-27

## 2022-12-27 RX ORDER — ONDANSETRON 8 MG/1
4 TABLET, FILM COATED ORAL ONCE
Refills: 0 | Status: DISCONTINUED | OUTPATIENT
Start: 2022-12-27 | End: 2022-12-27

## 2022-12-27 RX ORDER — OXYCODONE HYDROCHLORIDE 5 MG/1
10 TABLET ORAL ONCE
Refills: 0 | Status: DISCONTINUED | OUTPATIENT
Start: 2022-12-27 | End: 2022-12-27

## 2022-12-27 RX ORDER — OXYCODONE HYDROCHLORIDE 5 MG/1
5 TABLET ORAL ONCE
Refills: 0 | Status: DISCONTINUED | OUTPATIENT
Start: 2022-12-27 | End: 2022-12-27

## 2022-12-27 RX ADMIN — OXYCODONE HYDROCHLORIDE 5 MILLIGRAM(S): 5 TABLET ORAL at 16:30

## 2022-12-27 RX ADMIN — SODIUM CHLORIDE 75 MILLILITER(S): 9 INJECTION, SOLUTION INTRAVENOUS at 15:39

## 2022-12-27 RX ADMIN — OXYCODONE HYDROCHLORIDE 5 MILLIGRAM(S): 5 TABLET ORAL at 18:45

## 2022-12-27 RX ADMIN — OXYCODONE HYDROCHLORIDE 5 MILLIGRAM(S): 5 TABLET ORAL at 15:53

## 2022-12-27 RX ADMIN — FENTANYL CITRATE 50 MICROGRAM(S): 50 INJECTION INTRAVENOUS at 15:39

## 2022-12-27 RX ADMIN — FENTANYL CITRATE 50 MICROGRAM(S): 50 INJECTION INTRAVENOUS at 15:54

## 2022-12-27 NOTE — BRIEF OPERATIVE NOTE - NSICDXBRIEFPOSTOP_GEN_ALL_CORE_FT
POST-OP DIAGNOSIS:  Internal and external bleeding hemorrhoids 27-Dec-2022 15:24:24  Yoselyn Vasquez

## 2022-12-27 NOTE — ASU DISCHARGE PLAN (ADULT/PEDIATRIC) - CARE PROVIDER_API CALL
Morelia Cotto)  ColonRectal Surgery; [place]  [address] [address]  Phone: [phone]  Fax: [phone]  Follow Up Time: 1 month

## 2022-12-27 NOTE — BRIEF OPERATIVE NOTE - NSICDXBRIEFPREOP_GEN_ALL_CORE_FT
PRE-OP DIAGNOSIS:  Internal and external bleeding hemorrhoids 27-Dec-2022 15:24:13  Yoselyn Vasquez

## 2022-12-27 NOTE — BRIEF OPERATIVE NOTE - NSICDXBRIEFPROCEDURE_GEN_ALL_CORE_FT
PROCEDURES:  Complex hemorrhoidectomy, internal and external 27-Dec-2022 15:23:56  Yoselyn Vasquez

## 2022-12-27 NOTE — ASU PATIENT PROFILE, ADULT - IS PATIENT PREGNANT?
Health Maintenance Due   Topic Date Due   • Pneumococcal Vaccine 0-64 (2 of 4 - PPSV23) 01/11/2021   • DM/CKD Microalbumin  07/10/2021   • DM/CKD GFR  07/10/2021   • COVID-19 Vaccine (3 - Booster for Pfizer series) 10/23/2021       Patient is due for topics as listed above but is not proceeding with Immunization(s) COVID-19 and Pneumococcal, DM/CKD Microalbumin and Glomerular Filtration Rate (GFR) at this time.  
She is feeling a lot better. She had a URI a few weeks ago, improved now, was on antibiotics. No other new issues.    She desires to already schedule IOL for her LYNDON, arranged today.  
not applicable (Male)

## 2022-12-29 RX ORDER — OXYCODONE HYDROCHLORIDE 5 MG/1
2 TABLET ORAL
Qty: 20 | Refills: 0
Start: 2022-12-29

## 2022-12-30 DIAGNOSIS — K64.8 OTHER HEMORRHOIDS: ICD-10-CM

## 2022-12-30 DIAGNOSIS — K64.4 RESIDUAL HEMORRHOIDAL SKIN TAGS: ICD-10-CM

## 2022-12-30 DIAGNOSIS — Z87.891 PERSONAL HISTORY OF NICOTINE DEPENDENCE: ICD-10-CM

## 2022-12-30 PROBLEM — Z87.19 PERSONAL HISTORY OF OTHER DISEASES OF THE DIGESTIVE SYSTEM: Chronic | Status: ACTIVE | Noted: 2022-12-22

## 2022-12-30 LAB — SURGICAL PATHOLOGY STUDY: SIGNIFICANT CHANGE UP

## 2023-01-19 ENCOUNTER — APPOINTMENT (OUTPATIENT)
Dept: COLORECTAL SURGERY | Facility: CLINIC | Age: 33
End: 2023-01-19
Payer: COMMERCIAL

## 2023-01-19 VITALS
HEART RATE: 87 BPM | DIASTOLIC BLOOD PRESSURE: 68 MMHG | HEIGHT: 66 IN | WEIGHT: 170 LBS | SYSTOLIC BLOOD PRESSURE: 103 MMHG | BODY MASS INDEX: 27.32 KG/M2

## 2023-01-19 PROCEDURE — 99212 OFFICE O/P EST SF 10 MIN: CPT

## 2023-01-19 NOTE — HISTORY OF PRESENT ILLNESS
[FreeTextEntry1] : 32-year-old male presents for postoperative visit after undergoing hemorrhoidectomy on December 27.  He is recovering well from surgery.  The pain is subsiding and he is off narcotic medications.  He is having regular bowel movements without difficulty.  No fevers or chills.  He still has some drainage from the surgical site.  He continues to have issues with abdominal bloating and distention which usually results in the pressure sensation in the rectum.

## 2023-01-19 NOTE — PHYSICAL EXAM
[Respiratory Effort] : normal respiratory effort [Calm] : calm [de-identified] : Healing perianal wounds without sign of infection. [de-identified] : Well-appearing, in no distress [de-identified] : Normocephalic, atraumatic [de-identified] : Moves extremities without difficulty [de-identified] : Warm and dry [de-identified] : Alert and oriented x3

## 2023-02-16 ENCOUNTER — APPOINTMENT (OUTPATIENT)
Dept: COLORECTAL SURGERY | Facility: CLINIC | Age: 33
End: 2023-02-16
Payer: COMMERCIAL

## 2023-02-16 VITALS
HEART RATE: 96 BPM | SYSTOLIC BLOOD PRESSURE: 119 MMHG | BODY MASS INDEX: 27.32 KG/M2 | WEIGHT: 170 LBS | DIASTOLIC BLOOD PRESSURE: 76 MMHG | HEIGHT: 66 IN

## 2023-02-16 DIAGNOSIS — K64.9 UNSPECIFIED HEMORRHOIDS: ICD-10-CM

## 2023-02-16 PROCEDURE — 99024 POSTOP FOLLOW-UP VISIT: CPT

## 2023-02-16 NOTE — HISTORY OF PRESENT ILLNESS
[FreeTextEntry1] : 32-year-old male presents for postoperative visit after undergoing hemorrhoidectomy on December 27.  He has been doing very well since surgery and his pain has subsided.  He denies any rectal bleeding.  He was doing well with bowel movements until recently where he had a bout of constipation and did not have a bowel movement for 3 days. This resulted in significant rectal pressure and pain. This is slowly resolving.\par \par

## 2023-02-16 NOTE — PHYSICAL EXAM
[Respiratory Effort] : normal respiratory effort [Calm] : calm [de-identified] : Healed perianal wounds. GRACIE not performed [de-identified] : Well-appearing, in no distress [de-identified] : Normocephalic, atraumatic [de-identified] : Moves extremities without difficulty [de-identified] : Warm and dry [de-identified] : Alert and oriented x3

## 2023-05-03 ENCOUNTER — NON-APPOINTMENT (OUTPATIENT)
Age: 33
End: 2023-05-03

## 2023-05-11 ENCOUNTER — TRANSCRIPTION ENCOUNTER (OUTPATIENT)
Age: 33
End: 2023-05-11

## 2023-10-10 ENCOUNTER — APPOINTMENT (OUTPATIENT)
Dept: INTERNAL MEDICINE | Facility: CLINIC | Age: 33
End: 2023-10-10
Payer: COMMERCIAL

## 2023-10-10 ENCOUNTER — LABORATORY RESULT (OUTPATIENT)
Age: 33
End: 2023-10-10

## 2023-10-10 ENCOUNTER — NON-APPOINTMENT (OUTPATIENT)
Age: 33
End: 2023-10-10

## 2023-10-10 VITALS
HEART RATE: 76 BPM | OXYGEN SATURATION: 98 % | SYSTOLIC BLOOD PRESSURE: 110 MMHG | DIASTOLIC BLOOD PRESSURE: 68 MMHG | RESPIRATION RATE: 14 BRPM | BODY MASS INDEX: 27.97 KG/M2 | TEMPERATURE: 98.1 F | WEIGHT: 174 LBS | HEIGHT: 66 IN

## 2023-10-10 DIAGNOSIS — Z23 ENCOUNTER FOR IMMUNIZATION: ICD-10-CM

## 2023-10-10 DIAGNOSIS — Z00.00 ENCOUNTER FOR GENERAL ADULT MEDICAL EXAMINATION W/OUT ABNORMAL FINDINGS: ICD-10-CM

## 2023-10-10 DIAGNOSIS — M54.12 RADICULOPATHY, CERVICAL REGION: ICD-10-CM

## 2023-10-10 PROCEDURE — G0008: CPT

## 2023-10-10 PROCEDURE — 93000 ELECTROCARDIOGRAM COMPLETE: CPT

## 2023-10-10 PROCEDURE — 90686 IIV4 VACC NO PRSV 0.5 ML IM: CPT

## 2023-10-10 PROCEDURE — 99395 PREV VISIT EST AGE 18-39: CPT | Mod: 25

## 2023-10-11 LAB
ALBUMIN SERPL ELPH-MCNC: 4.7 G/DL
ALP BLD-CCNC: 73 U/L
ALT SERPL-CCNC: 14 U/L
ANION GAP SERPL CALC-SCNC: 14 MMOL/L
APPEARANCE: ABNORMAL
AST SERPL-CCNC: 15 U/L
BILIRUB SERPL-MCNC: 0.3 MG/DL
BILIRUBIN URINE: NEGATIVE
BLOOD URINE: NEGATIVE
BUN SERPL-MCNC: 16 MG/DL
CALCIUM SERPL-MCNC: 9.8 MG/DL
CHLORIDE SERPL-SCNC: 103 MMOL/L
CHOLEST SERPL-MCNC: 217 MG/DL
CO2 SERPL-SCNC: 25 MMOL/L
COLOR: YELLOW
CREAT SERPL-MCNC: 0.86 MG/DL
EGFR: 116 ML/MIN/1.73M2
ESTIMATED AVERAGE GLUCOSE: 105 MG/DL
FOLATE SERPL-MCNC: 4.1 NG/ML
GLUCOSE QUALITATIVE U: NEGATIVE MG/DL
GLUCOSE SERPL-MCNC: 86 MG/DL
HBA1C MFR BLD HPLC: 5.3 %
HCT VFR BLD CALC: 45.2 %
HDLC SERPL-MCNC: 54 MG/DL
HGB BLD-MCNC: 14.4 G/DL
KETONES URINE: NEGATIVE MG/DL
LDLC SERPL CALC-MCNC: 149 MG/DL
LEUKOCYTE ESTERASE URINE: NEGATIVE
MCHC RBC-ENTMCNC: 25.6 PG
MCHC RBC-ENTMCNC: 31.9 GM/DL
MCV RBC AUTO: 80.4 FL
NITRITE URINE: NEGATIVE
NONHDLC SERPL-MCNC: 163 MG/DL
PH URINE: 7
PLATELET # BLD AUTO: 206 K/UL
POTASSIUM SERPL-SCNC: 4.4 MMOL/L
PROT SERPL-MCNC: 6.8 G/DL
PROTEIN URINE: NEGATIVE MG/DL
PSA SERPL-MCNC: 0.75 NG/ML
RBC # BLD: 5.62 M/UL
RBC # FLD: 15.5 %
SODIUM SERPL-SCNC: 142 MMOL/L
SPECIFIC GRAVITY URINE: 1.02
TRIGL SERPL-MCNC: 76 MG/DL
TSH SERPL-ACNC: 2.18 UIU/ML
UROBILINOGEN URINE: 0.2 MG/DL
VIT B12 SERPL-MCNC: 472 PG/ML
WBC # FLD AUTO: 7.86 K/UL

## 2023-11-14 ENCOUNTER — RX RENEWAL (OUTPATIENT)
Age: 33
End: 2023-11-14

## 2024-01-28 ENCOUNTER — RX RENEWAL (OUTPATIENT)
Age: 34
End: 2024-01-28

## 2024-01-28 RX ORDER — CYCLOBENZAPRINE HYDROCHLORIDE 10 MG/1
10 TABLET, FILM COATED ORAL 3 TIMES DAILY
Qty: 20 | Refills: 1 | Status: ACTIVE | COMMUNITY
Start: 2023-10-10 | End: 1900-01-01

## 2024-07-16 ENCOUNTER — NON-APPOINTMENT (OUTPATIENT)
Age: 34
End: 2024-07-16

## 2024-07-16 ENCOUNTER — APPOINTMENT (OUTPATIENT)
Dept: COLORECTAL SURGERY | Facility: CLINIC | Age: 34
End: 2024-07-16
Payer: COMMERCIAL

## 2024-07-16 VITALS
DIASTOLIC BLOOD PRESSURE: 75 MMHG | HEART RATE: 75 BPM | TEMPERATURE: 98 F | BODY MASS INDEX: 28.12 KG/M2 | HEIGHT: 66 IN | WEIGHT: 175 LBS | OXYGEN SATURATION: 99 % | SYSTOLIC BLOOD PRESSURE: 116 MMHG | RESPIRATION RATE: 14 BRPM

## 2024-07-16 DIAGNOSIS — K60.2 ANAL FISSURE, UNSPECIFIED: ICD-10-CM

## 2024-07-16 PROCEDURE — 99214 OFFICE O/P EST MOD 30 MIN: CPT

## 2024-08-20 ENCOUNTER — APPOINTMENT (OUTPATIENT)
Dept: COLORECTAL SURGERY | Facility: CLINIC | Age: 34
End: 2024-08-20
Payer: COMMERCIAL

## 2024-08-20 VITALS
RESPIRATION RATE: 14 BRPM | OXYGEN SATURATION: 99 % | BODY MASS INDEX: 27.64 KG/M2 | HEIGHT: 66 IN | HEART RATE: 70 BPM | DIASTOLIC BLOOD PRESSURE: 70 MMHG | SYSTOLIC BLOOD PRESSURE: 105 MMHG | TEMPERATURE: 97.6 F | WEIGHT: 172 LBS

## 2024-08-20 DIAGNOSIS — K60.2 ANAL FISSURE, UNSPECIFIED: ICD-10-CM

## 2024-08-20 PROCEDURE — 99214 OFFICE O/P EST MOD 30 MIN: CPT

## 2024-10-24 ENCOUNTER — APPOINTMENT (OUTPATIENT)
Dept: INTERNAL MEDICINE | Facility: CLINIC | Age: 34
End: 2024-10-24

## 2024-10-24 ENCOUNTER — NON-APPOINTMENT (OUTPATIENT)
Age: 34
End: 2024-10-24

## 2024-10-24 VITALS
WEIGHT: 179 LBS | SYSTOLIC BLOOD PRESSURE: 110 MMHG | DIASTOLIC BLOOD PRESSURE: 80 MMHG | TEMPERATURE: 98.5 F | BODY MASS INDEX: 28.77 KG/M2 | OXYGEN SATURATION: 98 % | HEIGHT: 66 IN | RESPIRATION RATE: 14 BRPM | HEART RATE: 76 BPM

## 2024-10-24 DIAGNOSIS — Z23 ENCOUNTER FOR IMMUNIZATION: ICD-10-CM

## 2024-10-24 DIAGNOSIS — Z00.00 ENCOUNTER FOR GENERAL ADULT MEDICAL EXAMINATION W/OUT ABNORMAL FINDINGS: ICD-10-CM

## 2024-10-24 DIAGNOSIS — K60.2 ANAL FISSURE, UNSPECIFIED: ICD-10-CM

## 2024-10-24 PROCEDURE — 90656 IIV3 VACC NO PRSV 0.5 ML IM: CPT

## 2024-10-24 PROCEDURE — 93000 ELECTROCARDIOGRAM COMPLETE: CPT

## 2024-10-24 PROCEDURE — G0008: CPT

## 2024-10-24 PROCEDURE — 99395 PREV VISIT EST AGE 18-39: CPT | Mod: 25

## 2024-10-26 LAB
ALBUMIN SERPL ELPH-MCNC: 4.9 G/DL
ALP BLD-CCNC: 63 U/L
ALT SERPL-CCNC: 15 U/L
ANION GAP SERPL CALC-SCNC: 14 MMOL/L
APPEARANCE: CLEAR
AST SERPL-CCNC: 15 U/L
BILIRUB SERPL-MCNC: 0.4 MG/DL
BILIRUBIN URINE: NEGATIVE
BLOOD URINE: NEGATIVE
BUN SERPL-MCNC: 16 MG/DL
CALCIUM SERPL-MCNC: 10.2 MG/DL
CHLORIDE SERPL-SCNC: 102 MMOL/L
CHOLEST SERPL-MCNC: 241 MG/DL
CO2 SERPL-SCNC: 24 MMOL/L
COLOR: YELLOW
CREAT SERPL-MCNC: 0.98 MG/DL
EGFR: 102 ML/MIN/1.73M2
ESTIMATED AVERAGE GLUCOSE: 103 MG/DL
FOLATE SERPL-MCNC: 4 NG/ML
GLUCOSE QUALITATIVE U: NEGATIVE MG/DL
GLUCOSE SERPL-MCNC: 93 MG/DL
HBA1C MFR BLD HPLC: 5.2 %
HCT VFR BLD CALC: 45.4 %
HDLC SERPL-MCNC: 58 MG/DL
HGB BLD-MCNC: 14.4 G/DL
KETONES URINE: NEGATIVE MG/DL
LDLC SERPL CALC-MCNC: 166 MG/DL
LEUKOCYTE ESTERASE URINE: NEGATIVE
MCHC RBC-ENTMCNC: 25.4 PG
MCHC RBC-ENTMCNC: 31.7 GM/DL
MCV RBC AUTO: 80.2 FL
NITRITE URINE: NEGATIVE
NONHDLC SERPL-MCNC: 184 MG/DL
PH URINE: 6.5
PLATELET # BLD AUTO: 221 K/UL
POTASSIUM SERPL-SCNC: 5.1 MMOL/L
PROT SERPL-MCNC: 7 G/DL
PROTEIN URINE: NEGATIVE MG/DL
PSA SERPL-MCNC: 0.79 NG/ML
RBC # BLD: 5.66 M/UL
RBC # FLD: 15.3 %
SODIUM SERPL-SCNC: 141 MMOL/L
SPECIFIC GRAVITY URINE: 1.02
TRIGL SERPL-MCNC: 98 MG/DL
TSH SERPL-ACNC: 1.84 UIU/ML
UROBILINOGEN URINE: 0.2 MG/DL
VIT B12 SERPL-MCNC: 439 PG/ML
WBC # FLD AUTO: 7.84 K/UL